# Patient Record
Sex: FEMALE | URBAN - METROPOLITAN AREA
[De-identification: names, ages, dates, MRNs, and addresses within clinical notes are randomized per-mention and may not be internally consistent; named-entity substitution may affect disease eponyms.]

---

## 2021-06-09 ENCOUNTER — APPOINTMENT (RX ONLY)
Dept: URBAN - METROPOLITAN AREA CLINIC 99 | Facility: CLINIC | Age: 81
Setting detail: DERMATOLOGY
End: 2021-06-09

## 2021-06-09 DIAGNOSIS — Z71.89 OTHER SPECIFIED COUNSELING: ICD-10-CM

## 2021-06-09 DIAGNOSIS — D18.0 HEMANGIOMA: ICD-10-CM

## 2021-06-09 DIAGNOSIS — L82.1 OTHER SEBORRHEIC KERATOSIS: ICD-10-CM

## 2021-06-09 DIAGNOSIS — L57.0 ACTINIC KERATOSIS: ICD-10-CM

## 2021-06-09 DIAGNOSIS — D22 MELANOCYTIC NEVI: ICD-10-CM

## 2021-06-09 DIAGNOSIS — L72.8 OTHER FOLLICULAR CYSTS OF THE SKIN AND SUBCUTANEOUS TISSUE: ICD-10-CM

## 2021-06-09 PROBLEM — D22.5 MELANOCYTIC NEVI OF TRUNK: Status: ACTIVE | Noted: 2021-06-09

## 2021-06-09 PROBLEM — D22.62 MELANOCYTIC NEVI OF LEFT UPPER LIMB, INCLUDING SHOULDER: Status: ACTIVE | Noted: 2021-06-09

## 2021-06-09 PROBLEM — D22.21 MELANOCYTIC NEVI OF RIGHT EAR AND EXTERNAL AURICULAR CANAL: Status: ACTIVE | Noted: 2021-06-09

## 2021-06-09 PROBLEM — D22.4 MELANOCYTIC NEVI OF SCALP AND NECK: Status: ACTIVE | Noted: 2021-06-09

## 2021-06-09 PROBLEM — D18.01 HEMANGIOMA OF SKIN AND SUBCUTANEOUS TISSUE: Status: ACTIVE | Noted: 2021-06-09

## 2021-06-09 PROCEDURE — ? LIQUID NITROGEN

## 2021-06-09 PROCEDURE — 99203 OFFICE O/P NEW LOW 30 MIN: CPT | Mod: 25

## 2021-06-09 PROCEDURE — 17003 DESTRUCT PREMALG LES 2-14: CPT

## 2021-06-09 PROCEDURE — ? SUNSCREEN RECOMMENDATIONS

## 2021-06-09 PROCEDURE — 17000 DESTRUCT PREMALG LESION: CPT

## 2021-06-09 PROCEDURE — ? COUNSELING

## 2021-06-09 ASSESSMENT — LOCATION SIMPLE DESCRIPTION DERM
LOCATION SIMPLE: RIGHT UPPER BACK
LOCATION SIMPLE: LEFT AXILLARY VAULT
LOCATION SIMPLE: ABDOMEN
LOCATION SIMPLE: LEFT FOREARM
LOCATION SIMPLE: RIGHT EAR
LOCATION SIMPLE: LEFT SHOULDER
LOCATION SIMPLE: RIGHT ANTERIOR NECK
LOCATION SIMPLE: RIGHT FOREARM
LOCATION SIMPLE: LEFT UPPER BACK
LOCATION SIMPLE: SCALP

## 2021-06-09 ASSESSMENT — LOCATION DETAILED DESCRIPTION DERM
LOCATION DETAILED: LEFT RIB CAGE
LOCATION DETAILED: LEFT SUPERIOR UPPER BACK
LOCATION DETAILED: RIGHT CENTRAL POSTAURICULAR SKIN
LOCATION DETAILED: LEFT PROXIMAL DORSAL FOREARM
LOCATION DETAILED: RIGHT MID-UPPER BACK
LOCATION DETAILED: RIGHT DISTAL DORSAL FOREARM
LOCATION DETAILED: LEFT AXILLARY VAULT
LOCATION DETAILED: RIGHT CLAVICULAR NECK
LOCATION DETAILED: LEFT POSTERIOR SHOULDER
LOCATION DETAILED: RIGHT ANTERIOR EARLOBE

## 2021-06-09 ASSESSMENT — LOCATION ZONE DERM
LOCATION ZONE: NECK
LOCATION ZONE: TRUNK
LOCATION ZONE: EAR
LOCATION ZONE: SCALP
LOCATION ZONE: AXILLAE
LOCATION ZONE: ARM
LOCATION ZONE: TRUNK

## 2021-06-09 NOTE — PROCEDURE: MIPS QUALITY
Quality 226: Preventive Care And Screening: Tobacco Use: Screening And Cessation Intervention: Patient screened for tobacco use and is an ex/non-smoker
Quality 111:Pneumonia Vaccination Status For Older Adults: Pneumococcal Vaccination Previously Received
Detail Level: Zone
Quality 431: Preventive Care And Screening: Unhealthy Alcohol Use - Screening: Patient screened for unhealthy alcohol use using a single question and scores less than 2 times per year
Quality 110: Preventive Care And Screening: Influenza Immunization: Influenza Immunization Administered during Influenza season

## 2023-04-23 ENCOUNTER — HOSPITAL ENCOUNTER (INPATIENT)
Dept: HOSPITAL 92 - ERS | Age: 83
LOS: 5 days | Discharge: NURSING, REHAB OR LONG-TERM CARE | DRG: 689 | End: 2023-04-28
Attending: STUDENT IN AN ORGANIZED HEALTH CARE EDUCATION/TRAINING PROGRAM
Payer: MEDICARE

## 2023-04-23 VITALS — BODY MASS INDEX: 27.9 KG/M2

## 2023-04-23 DIAGNOSIS — Z79.899: ICD-10-CM

## 2023-04-23 DIAGNOSIS — G93.40: ICD-10-CM

## 2023-04-23 DIAGNOSIS — G93.89: ICD-10-CM

## 2023-04-23 DIAGNOSIS — I21.A1: ICD-10-CM

## 2023-04-23 DIAGNOSIS — B96.20: ICD-10-CM

## 2023-04-23 DIAGNOSIS — G45.9: ICD-10-CM

## 2023-04-23 DIAGNOSIS — M62.82: ICD-10-CM

## 2023-04-23 DIAGNOSIS — D72.829: ICD-10-CM

## 2023-04-23 DIAGNOSIS — E78.5: ICD-10-CM

## 2023-04-23 DIAGNOSIS — N39.0: Primary | ICD-10-CM

## 2023-04-23 DIAGNOSIS — Z66: ICD-10-CM

## 2023-04-23 DIAGNOSIS — I10: ICD-10-CM

## 2023-04-23 DIAGNOSIS — E87.6: ICD-10-CM

## 2023-04-23 LAB
ALBUMIN SERPL BCG-MCNC: 4.3 G/DL (ref 3.4–4.8)
ALP SERPL-CCNC: 92 U/L (ref 40–110)
ALT SERPL W P-5'-P-CCNC: 27 U/L (ref 8–55)
ANION GAP SERPL CALC-SCNC: 18 MMOL/L (ref 10–20)
APTT PPP: 30.1 SEC (ref 22.9–36.1)
AST SERPL-CCNC: 86 U/L (ref 5–34)
BACTERIA UR QL AUTO: (no result) HPF
BASOPHILS # BLD AUTO: 0 THOU/UL (ref 0–0.2)
BASOPHILS NFR BLD AUTO: 0.2 % (ref 0–1)
BILIRUB SERPL-MCNC: 1.2 MG/DL (ref 0.2–1.2)
BUN SERPL-MCNC: 19 MG/DL (ref 9.8–20.1)
CALCIUM SERPL-MCNC: 11 MG/DL (ref 7.8–10.44)
CHLORIDE SERPL-SCNC: 105 MMOL/L (ref 98–107)
CK MB SERPL-MCNC: 26.6 NG/ML (ref 0–6.6)
CK SERPL-CCNC: 3875 U/L (ref 29–168)
CO2 SERPL-SCNC: 21 MMOL/L (ref 23–31)
CREAT CL PREDICTED SERPL C-G-VRATE: 0 ML/MIN (ref 70–130)
EOSINOPHIL # BLD AUTO: 0 THOU/UL (ref 0–0.7)
EOSINOPHIL NFR BLD AUTO: 0.1 % (ref 0–10)
GLOBULIN SER CALC-MCNC: 3.3 G/DL (ref 2.4–3.5)
GLUCOSE SERPL-MCNC: 111 MG/DL (ref 83–110)
HGB BLD-MCNC: 14.4 G/DL (ref 12–16)
INR PPP: 1.1
LEUKOCYTE ESTERASE UR QL STRIP.AUTO: 500 LEU/UL
LIPASE SERPL-CCNC: 18 U/L (ref 8–78)
LYMPHOCYTES # BLD: 0.8 THOU/UL (ref 1.2–3.4)
LYMPHOCYTES NFR BLD AUTO: 7.1 % (ref 21–51)
MCH RBC QN AUTO: 30.5 PG (ref 27–31)
MCV RBC AUTO: 90.8 FL (ref 78–98)
MONOCYTES # BLD AUTO: 0.8 THOU/UL (ref 0.11–0.59)
MONOCYTES NFR BLD AUTO: 6.6 % (ref 0–10)
NEUTROPHILS # BLD AUTO: 10.3 THOU/UL (ref 1.4–6.5)
NEUTROPHILS NFR BLD AUTO: 86 % (ref 42–75)
PLATELET # BLD AUTO: 248 10X3/UL (ref 130–400)
POTASSIUM SERPL-SCNC: 3.7 MMOL/L (ref 3.5–5.1)
PROT UR STRIP.AUTO-MCNC: 50 MG/DL
PROTHROMBIN TIME: 14.2 SEC (ref 12–14.7)
RBC # BLD AUTO: 4.72 MILL/UL (ref 4.2–5.4)
RBC UR QL AUTO: (no result) HPF (ref 0–3)
SODIUM SERPL-SCNC: 140 MMOL/L (ref 136–145)
SP GR UR STRIP: 1.02 (ref 1–1.04)
TROPONIN I SERPL DL<=0.01 NG/ML-MCNC: 0.05 NG/ML (ref ?–0.03)
TROPONIN I SERPL DL<=0.01 NG/ML-MCNC: 0.06 NG/ML (ref ?–0.03)
WBC # BLD AUTO: 12 10X3/UL (ref 4.8–10.8)

## 2023-04-23 PROCEDURE — 83690 ASSAY OF LIPASE: CPT

## 2023-04-23 PROCEDURE — 82553 CREATINE MB FRACTION: CPT

## 2023-04-23 PROCEDURE — 95957 EEG DIGITAL ANALYSIS: CPT

## 2023-04-23 PROCEDURE — 81003 URINALYSIS AUTO W/O SCOPE: CPT

## 2023-04-23 PROCEDURE — 87086 URINE CULTURE/COLONY COUNT: CPT

## 2023-04-23 PROCEDURE — 93306 TTE W/DOPPLER COMPLETE: CPT

## 2023-04-23 PROCEDURE — 96361 HYDRATE IV INFUSION ADD-ON: CPT

## 2023-04-23 PROCEDURE — 71045 X-RAY EXAM CHEST 1 VIEW: CPT

## 2023-04-23 PROCEDURE — 70450 CT HEAD/BRAIN W/O DYE: CPT

## 2023-04-23 PROCEDURE — 82550 ASSAY OF CK (CPK): CPT

## 2023-04-23 PROCEDURE — 85025 COMPLETE CBC W/AUTO DIFF WBC: CPT

## 2023-04-23 PROCEDURE — 80053 COMPREHEN METABOLIC PANEL: CPT

## 2023-04-23 PROCEDURE — 80061 LIPID PANEL: CPT

## 2023-04-23 PROCEDURE — 87077 CULTURE AEROBIC IDENTIFY: CPT

## 2023-04-23 PROCEDURE — 95819 EEG AWAKE AND ASLEEP: CPT

## 2023-04-23 PROCEDURE — 36416 COLLJ CAPILLARY BLOOD SPEC: CPT

## 2023-04-23 PROCEDURE — 95712 VEEG 2-12 HR INTMT MNTR: CPT

## 2023-04-23 PROCEDURE — 84484 ASSAY OF TROPONIN QUANT: CPT

## 2023-04-23 PROCEDURE — 80048 BASIC METABOLIC PNL TOTAL CA: CPT

## 2023-04-23 PROCEDURE — 96365 THER/PROPH/DIAG IV INF INIT: CPT

## 2023-04-23 PROCEDURE — 93880 EXTRACRANIAL BILAT STUDY: CPT

## 2023-04-23 PROCEDURE — 87186 SC STD MICRODIL/AGAR DIL: CPT

## 2023-04-23 PROCEDURE — 84443 ASSAY THYROID STIM HORMONE: CPT

## 2023-04-23 PROCEDURE — 70551 MRI BRAIN STEM W/O DYE: CPT

## 2023-04-23 PROCEDURE — 81015 MICROSCOPIC EXAM OF URINE: CPT

## 2023-04-23 PROCEDURE — 85610 PROTHROMBIN TIME: CPT

## 2023-04-23 PROCEDURE — 36415 COLL VENOUS BLD VENIPUNCTURE: CPT

## 2023-04-23 PROCEDURE — 85730 THROMBOPLASTIN TIME PARTIAL: CPT

## 2023-04-23 PROCEDURE — 51701 INSERT BLADDER CATHETER: CPT

## 2023-04-24 LAB
ALBUMIN SERPL BCG-MCNC: 3.7 G/DL (ref 3.4–4.8)
ALP SERPL-CCNC: 72 U/L (ref 40–110)
ALT SERPL W P-5'-P-CCNC: 26 U/L (ref 8–55)
ANION GAP SERPL CALC-SCNC: 14 MMOL/L (ref 10–20)
AST SERPL-CCNC: 79 U/L (ref 5–34)
BASOPHILS # BLD AUTO: 0 THOU/UL (ref 0–0.2)
BASOPHILS NFR BLD AUTO: 0.2 % (ref 0–1)
BILIRUB SERPL-MCNC: 0.9 MG/DL (ref 0.2–1.2)
BUN SERPL-MCNC: 20 MG/DL (ref 9.8–20.1)
CALCIUM SERPL-MCNC: 9.9 MG/DL (ref 7.8–10.44)
CHD RISK SERPL-RTO: 2.6 (ref ?–4.5)
CHLORIDE SERPL-SCNC: 109 MMOL/L (ref 98–107)
CHOLEST SERPL-MCNC: 113 MG/DL
CK SERPL-CCNC: 2367 U/L (ref 29–168)
CO2 SERPL-SCNC: 19 MMOL/L (ref 23–31)
CREAT CL PREDICTED SERPL C-G-VRATE: 85 ML/MIN (ref 70–130)
EOSINOPHIL # BLD AUTO: 0.1 THOU/UL (ref 0–0.7)
EOSINOPHIL NFR BLD AUTO: 1.5 % (ref 0–10)
GLOBULIN SER CALC-MCNC: 2.7 G/DL (ref 2.4–3.5)
GLUCOSE SERPL-MCNC: 79 MG/DL (ref 83–110)
HDLC SERPL-MCNC: 43 MG/DL
HGB BLD-MCNC: 13.2 G/DL (ref 12–16)
LDLC SERPL CALC-MCNC: 57 MG/DL
LYMPHOCYTES # BLD: 1.2 THOU/UL (ref 1.2–3.4)
LYMPHOCYTES NFR BLD AUTO: 13.6 % (ref 21–51)
MCH RBC QN AUTO: 29.9 PG (ref 27–31)
MCV RBC AUTO: 91.6 FL (ref 78–98)
MONOCYTES # BLD AUTO: 0.6 THOU/UL (ref 0.11–0.59)
MONOCYTES NFR BLD AUTO: 7 % (ref 0–10)
NEUTROPHILS # BLD AUTO: 7 THOU/UL (ref 1.4–6.5)
NEUTROPHILS NFR BLD AUTO: 77.7 % (ref 42–75)
PLATELET # BLD AUTO: 195 10X3/UL (ref 130–400)
POTASSIUM SERPL-SCNC: 3.4 MMOL/L (ref 3.5–5.1)
RBC # BLD AUTO: 4.41 MILL/UL (ref 4.2–5.4)
SODIUM SERPL-SCNC: 139 MMOL/L (ref 136–145)
TRIGL SERPL-MCNC: 65 MG/DL (ref ?–150)
WBC # BLD AUTO: 9 10X3/UL (ref 4.8–10.8)

## 2023-04-24 RX ADMIN — CEFTRIAXONE SCH MLS: 1 INJECTION, POWDER, FOR SOLUTION INTRAMUSCULAR; INTRAVENOUS at 16:33

## 2023-04-24 RX ADMIN — ASPIRIN SCH MG: 81 TABLET ORAL at 10:10

## 2023-04-25 LAB
ANION GAP SERPL CALC-SCNC: 13 MMOL/L (ref 10–20)
BUN SERPL-MCNC: 11 MG/DL (ref 9.8–20.1)
CALCIUM SERPL-MCNC: 9.9 MG/DL (ref 7.8–10.44)
CHLORIDE SERPL-SCNC: 107 MMOL/L (ref 98–107)
CK SERPL-CCNC: 1372 U/L (ref 29–168)
CO2 SERPL-SCNC: 24 MMOL/L (ref 23–31)
CREAT CL PREDICTED SERPL C-G-VRATE: 85 ML/MIN (ref 70–130)
GLUCOSE SERPL-MCNC: 94 MG/DL (ref 83–110)
POTASSIUM SERPL-SCNC: 4 MMOL/L (ref 3.5–5.1)
SODIUM SERPL-SCNC: 140 MMOL/L (ref 136–145)

## 2023-04-25 RX ADMIN — CEFTRIAXONE SCH MLS: 1 INJECTION, POWDER, FOR SOLUTION INTRAMUSCULAR; INTRAVENOUS at 15:09

## 2023-04-25 RX ADMIN — ASPIRIN SCH MG: 81 TABLET ORAL at 08:54

## 2023-04-26 RX ADMIN — ASPIRIN SCH MG: 81 TABLET ORAL at 09:23

## 2023-04-26 RX ADMIN — CEFTRIAXONE SCH MLS: 1 INJECTION, POWDER, FOR SOLUTION INTRAMUSCULAR; INTRAVENOUS at 15:55

## 2023-04-27 LAB
ANION GAP SERPL CALC-SCNC: 13 MMOL/L (ref 10–20)
BASOPHILS # BLD AUTO: 0 THOU/UL (ref 0–0.2)
BASOPHILS NFR BLD AUTO: 0.3 % (ref 0–1)
BUN SERPL-MCNC: 14 MG/DL (ref 9.8–20.1)
CALCIUM SERPL-MCNC: 9.1 MG/DL (ref 7.8–10.44)
CHLORIDE SERPL-SCNC: 105 MMOL/L (ref 98–107)
CK SERPL-CCNC: 413 U/L (ref 29–168)
CO2 SERPL-SCNC: 22 MMOL/L (ref 23–31)
CREAT CL PREDICTED SERPL C-G-VRATE: 91 ML/MIN (ref 70–130)
EOSINOPHIL # BLD AUTO: 0.3 THOU/UL (ref 0–0.7)
EOSINOPHIL NFR BLD AUTO: 3.5 % (ref 0–10)
GLUCOSE SERPL-MCNC: 95 MG/DL (ref 83–110)
HGB BLD-MCNC: 11.3 G/DL (ref 12–16)
LYMPHOCYTES # BLD: 0.7 THOU/UL (ref 1.2–3.4)
LYMPHOCYTES NFR BLD AUTO: 8.2 % (ref 21–51)
MCH RBC QN AUTO: 30.6 PG (ref 27–31)
MCV RBC AUTO: 92.3 FL (ref 78–98)
MONOCYTES # BLD AUTO: 0.8 THOU/UL (ref 0.11–0.59)
MONOCYTES NFR BLD AUTO: 9.2 % (ref 0–10)
NEUTROPHILS # BLD AUTO: 6.9 THOU/UL (ref 1.4–6.5)
NEUTROPHILS NFR BLD AUTO: 78.8 % (ref 42–75)
PLATELET # BLD AUTO: 191 10X3/UL (ref 130–400)
POTASSIUM SERPL-SCNC: 3.2 MMOL/L (ref 3.5–5.1)
RBC # BLD AUTO: 3.69 MILL/UL (ref 4.2–5.4)
SODIUM SERPL-SCNC: 137 MMOL/L (ref 136–145)
WBC # BLD AUTO: 8.8 10X3/UL (ref 4.8–10.8)

## 2023-04-27 RX ADMIN — CEFTRIAXONE SCH MLS: 1 INJECTION, POWDER, FOR SOLUTION INTRAMUSCULAR; INTRAVENOUS at 15:04

## 2023-04-27 RX ADMIN — ASPIRIN SCH MG: 81 TABLET ORAL at 09:30

## 2023-04-28 VITALS — SYSTOLIC BLOOD PRESSURE: 139 MMHG | TEMPERATURE: 98.1 F | DIASTOLIC BLOOD PRESSURE: 82 MMHG

## 2023-04-28 LAB
ANION GAP SERPL CALC-SCNC: 11 MMOL/L (ref 10–20)
BASOPHILS # BLD AUTO: 0 THOU/UL (ref 0–0.2)
BASOPHILS NFR BLD AUTO: 0.2 % (ref 0–1)
BUN SERPL-MCNC: 12 MG/DL (ref 9.8–20.1)
CALCIUM SERPL-MCNC: 9 MG/DL (ref 7.8–10.44)
CHLORIDE SERPL-SCNC: 107 MMOL/L (ref 98–107)
CO2 SERPL-SCNC: 23 MMOL/L (ref 23–31)
CREAT CL PREDICTED SERPL C-G-VRATE: 87 ML/MIN (ref 70–130)
EOSINOPHIL # BLD AUTO: 0.2 THOU/UL (ref 0–0.7)
EOSINOPHIL NFR BLD AUTO: 3.3 % (ref 0–10)
GLUCOSE SERPL-MCNC: 100 MG/DL (ref 83–110)
HGB BLD-MCNC: 11.4 G/DL (ref 12–16)
LYMPHOCYTES # BLD: 0.7 THOU/UL (ref 1.2–3.4)
LYMPHOCYTES NFR BLD AUTO: 9.3 % (ref 21–51)
MCH RBC QN AUTO: 31 PG (ref 27–31)
MCV RBC AUTO: 92.6 FL (ref 78–98)
MONOCYTES # BLD AUTO: 0.7 THOU/UL (ref 0.11–0.59)
MONOCYTES NFR BLD AUTO: 9.5 % (ref 0–10)
NEUTROPHILS # BLD AUTO: 5.5 THOU/UL (ref 1.4–6.5)
NEUTROPHILS NFR BLD AUTO: 77.8 % (ref 42–75)
PLATELET # BLD AUTO: 196 10X3/UL (ref 130–400)
POTASSIUM SERPL-SCNC: 3 MMOL/L (ref 3.5–5.1)
RBC # BLD AUTO: 3.66 MILL/UL (ref 4.2–5.4)
SODIUM SERPL-SCNC: 138 MMOL/L (ref 136–145)
WBC # BLD AUTO: 7 10X3/UL (ref 4.8–10.8)

## 2023-04-28 RX ADMIN — ASPIRIN SCH MG: 81 TABLET ORAL at 09:06

## 2023-06-16 ENCOUNTER — HOSPITAL ENCOUNTER (OUTPATIENT)
Dept: HOSPITAL 92 - CSHULT | Age: 83
Discharge: HOME | End: 2023-06-16
Attending: UROLOGY
Payer: MEDICARE

## 2023-06-16 DIAGNOSIS — R33.9: Primary | ICD-10-CM

## 2023-06-16 DIAGNOSIS — Z87.440: ICD-10-CM

## 2023-06-16 DIAGNOSIS — N28.89: ICD-10-CM

## 2023-06-16 DIAGNOSIS — N28.1: ICD-10-CM

## 2023-06-16 PROCEDURE — 76770 US EXAM ABDO BACK WALL COMP: CPT

## 2023-09-14 ENCOUNTER — HOSPITAL ENCOUNTER (OUTPATIENT)
Dept: HOSPITAL 92 - CSHCT | Age: 83
Discharge: HOME | End: 2023-09-14
Attending: UROLOGY
Payer: MEDICARE

## 2023-09-14 DIAGNOSIS — D17.71: ICD-10-CM

## 2023-09-14 DIAGNOSIS — K59.01: ICD-10-CM

## 2023-09-14 DIAGNOSIS — N28.1: ICD-10-CM

## 2023-09-14 DIAGNOSIS — N94.89: ICD-10-CM

## 2023-09-14 DIAGNOSIS — N28.89: Primary | ICD-10-CM

## 2023-09-14 LAB — EGFRCR SERPLBLD CKD-EPI 2021: 63 ML/MIN/{1.73_M2}

## 2023-09-14 PROCEDURE — 74178 CT ABD&PLV WO CNTR FLWD CNTR: CPT

## 2023-09-14 PROCEDURE — 82565 ASSAY OF CREATININE: CPT
